# Patient Record
Sex: MALE | Race: WHITE | Employment: OTHER | URBAN - METROPOLITAN AREA
[De-identification: names, ages, dates, MRNs, and addresses within clinical notes are randomized per-mention and may not be internally consistent; named-entity substitution may affect disease eponyms.]

---

## 2019-08-27 ENCOUNTER — OFFICE VISIT (OUTPATIENT)
Dept: DERMATOLOGY | Age: 79
End: 2019-08-27
Payer: MEDICARE

## 2019-08-27 DIAGNOSIS — L82.1 SEBORRHEIC KERATOSES: ICD-10-CM

## 2019-08-27 DIAGNOSIS — D48.9 NEOPLASM OF UNCERTAIN BEHAVIOR: ICD-10-CM

## 2019-08-27 DIAGNOSIS — D23.9 DERMATOFIBROMA: ICD-10-CM

## 2019-08-27 DIAGNOSIS — L57.0 ACTINIC KERATOSES: Primary | ICD-10-CM

## 2019-08-27 DIAGNOSIS — D22.9 MULTIPLE BENIGN MELANOCYTIC NEVI: ICD-10-CM

## 2019-08-27 DIAGNOSIS — Z78.9 NON-TOBACCO USER: ICD-10-CM

## 2019-08-27 PROCEDURE — 11102 TANGNTL BX SKIN SINGLE LES: CPT | Performed by: DERMATOLOGY

## 2019-08-27 PROCEDURE — 1123F ACP DISCUSS/DSCN MKR DOCD: CPT | Performed by: DERMATOLOGY

## 2019-08-27 PROCEDURE — 4040F PNEUMOC VAC/ADMIN/RCVD: CPT | Performed by: DERMATOLOGY

## 2019-08-27 PROCEDURE — 17000 DESTRUCT PREMALG LESION: CPT | Performed by: DERMATOLOGY

## 2019-08-27 PROCEDURE — 99203 OFFICE O/P NEW LOW 30 MIN: CPT | Performed by: DERMATOLOGY

## 2019-08-27 PROCEDURE — G8427 DOCREV CUR MEDS BY ELIG CLIN: HCPCS | Performed by: DERMATOLOGY

## 2019-08-27 PROCEDURE — G8421 BMI NOT CALCULATED: HCPCS | Performed by: DERMATOLOGY

## 2019-08-27 PROCEDURE — 17003 DESTRUCT PREMALG LES 2-14: CPT | Performed by: DERMATOLOGY

## 2019-08-27 PROCEDURE — 1036F TOBACCO NON-USER: CPT | Performed by: DERMATOLOGY

## 2019-08-27 NOTE — PROGRESS NOTES
surgical history. No Known Allergies  No outpatient medications have been marked as taking for the 8/27/19 encounter (Office Visit) with Darren Bardales DO. Social History:   Social History     Socioeconomic History    Marital status: Unknown     Spouse name: Not on file    Number of children: Not on file    Years of education: Not on file    Highest education level: Not on file   Occupational History    Not on file   Social Needs    Financial resource strain: Not on file    Food insecurity:     Worry: Not on file     Inability: Not on file    Transportation needs:     Medical: Not on file     Non-medical: Not on file   Tobacco Use    Smoking status: Never Smoker    Smokeless tobacco: Never Used   Substance and Sexual Activity    Alcohol use: Not on file    Drug use: Not on file    Sexual activity: Not on file   Lifestyle    Physical activity:     Days per week: Not on file     Minutes per session: Not on file    Stress: Not on file   Relationships    Social connections:     Talks on phone: Not on file     Gets together: Not on file     Attends Mu-ism service: Not on file     Active member of club or organization: Not on file     Attends meetings of clubs or organizations: Not on file     Relationship status: Not on file    Intimate partner violence:     Fear of current or ex partner: Not on file     Emotionally abused: Not on file     Physically abused: Not on file     Forced sexual activity: Not on file   Other Topics Concern    Not on file   Social History Narrative    Not on file       Physical Examination     The following were examined and determined to be normal: Psych/Neuro, Head/face, Conjunctivae/eyelids, Gums/teeth/lips, Neck, Breast/axilla/chest, Abdomen, RUE, LUE, RLE, LLE and Nails/digits. buttocks. Areas covered by underwear garment(s) not examined. The following were examined and determined to be abnormal: Scalp/hair and Back.      -General: A+Ox3, NAD,

## 2019-08-29 LAB — DERMATOLOGY PATHOLOGY REPORT: NORMAL

## 2019-08-30 ENCOUNTER — TELEPHONE (OUTPATIENT)
Dept: DERMATOLOGY | Age: 79
End: 2019-08-30

## 2019-08-30 NOTE — TELEPHONE ENCOUNTER
----- Message from Uyen Negro DO sent at 8/30/2019  8:44 AM EDT -----  Moderately atypical mole, excised/margins clear with biopsy. No further treatment needed at this time.   Please notify patient of result and recommend scheduling 1 year skin exam

## 2020-06-30 ENCOUNTER — OFFICE VISIT (OUTPATIENT)
Dept: DERMATOLOGY | Age: 80
End: 2020-06-30
Payer: MEDICARE

## 2020-06-30 VITALS — TEMPERATURE: 97.5 F

## 2020-06-30 PROCEDURE — 1123F ACP DISCUSS/DSCN MKR DOCD: CPT | Performed by: DERMATOLOGY

## 2020-06-30 PROCEDURE — 1036F TOBACCO NON-USER: CPT | Performed by: DERMATOLOGY

## 2020-06-30 PROCEDURE — G8421 BMI NOT CALCULATED: HCPCS | Performed by: DERMATOLOGY

## 2020-06-30 PROCEDURE — 4040F PNEUMOC VAC/ADMIN/RCVD: CPT | Performed by: DERMATOLOGY

## 2020-06-30 PROCEDURE — G8427 DOCREV CUR MEDS BY ELIG CLIN: HCPCS | Performed by: DERMATOLOGY

## 2020-06-30 PROCEDURE — 99213 OFFICE O/P EST LOW 20 MIN: CPT | Performed by: DERMATOLOGY

## 2020-06-30 PROCEDURE — 17110 DESTRUCTION B9 LES UP TO 14: CPT | Performed by: DERMATOLOGY

## 2020-06-30 NOTE — PATIENT INSTRUCTIONS
dry it out with rubbing alcohol or hydrogen peroxide.  Continue this regimen until the area is pink and healed. Depending on the size and location of your cryosurgery site, healing may take 2 to 4 weeks.  The area may continue to be pink for several weeks, and over the next few months may become darker or lighter than the surrounding skin. This may be a permanent change. Thanks for your visit! Feel free to call Dr. Janeth Nolasco assistantLenora if you have questions, concerns or to schedule your cosmetic procedures.

## 2020-06-30 NOTE — PROGRESS NOTES
dysplastic nevus located to right paraspinal superior back, completely excised with biopsy  Patient denies past history of melanoma, NMSC, dysplastic nevi, or chronic skin rashes. PFHx: Denies hx of MM or NMSC    History reviewed. No pertinent family history. History reviewed. No pertinent past medical history. History reviewed. No pertinent surgical history. No Known Allergies  No outpatient medications have been marked as taking for the 6/30/20 encounter (Office Visit) with Marilyn Pathak DO.        Social History:   Social History     Socioeconomic History    Marital status: Unknown     Spouse name: Not on file    Number of children: Not on file    Years of education: Not on file    Highest education level: Not on file   Occupational History    Not on file   Social Needs    Financial resource strain: Not on file    Food insecurity     Worry: Not on file     Inability: Not on file    Transportation needs     Medical: Not on file     Non-medical: Not on file   Tobacco Use    Smoking status: Never Smoker    Smokeless tobacco: Never Used   Substance and Sexual Activity    Alcohol use: Not on file    Drug use: Not on file    Sexual activity: Not on file   Lifestyle    Physical activity     Days per week: Not on file     Minutes per session: Not on file    Stress: Not on file   Relationships    Social connections     Talks on phone: Not on file     Gets together: Not on file     Attends Worship service: Not on file     Active member of club or organization: Not on file     Attends meetings of clubs or organizations: Not on file     Relationship status: Not on file    Intimate partner violence     Fear of current or ex partner: Not on file     Emotionally abused: Not on file     Physically abused: Not on file     Forced sexual activity: Not on file   Other Topics Concern    Not on file   Social History Narrative    Not on file       Physical Examination     The following were examined and

## 2021-07-01 ENCOUNTER — OFFICE VISIT (OUTPATIENT)
Dept: DERMATOLOGY | Age: 81
End: 2021-07-01
Payer: MEDICARE

## 2021-07-01 VITALS — TEMPERATURE: 97.9 F

## 2021-07-01 DIAGNOSIS — L57.0 ACTINIC KERATOSIS: ICD-10-CM

## 2021-07-01 DIAGNOSIS — L81.4 SOLAR LENTIGINOSIS: ICD-10-CM

## 2021-07-01 DIAGNOSIS — L82.0 INFLAMED SEBORRHEIC KERATOSIS: Primary | ICD-10-CM

## 2021-07-01 DIAGNOSIS — D22.9 MULTIPLE BENIGN NEVI: ICD-10-CM

## 2021-07-01 DIAGNOSIS — Z86.018 HISTORY OF DYSPLASTIC NEVUS: ICD-10-CM

## 2021-07-01 PROCEDURE — G8427 DOCREV CUR MEDS BY ELIG CLIN: HCPCS | Performed by: DERMATOLOGY

## 2021-07-01 PROCEDURE — 4040F PNEUMOC VAC/ADMIN/RCVD: CPT | Performed by: DERMATOLOGY

## 2021-07-01 PROCEDURE — G8421 BMI NOT CALCULATED: HCPCS | Performed by: DERMATOLOGY

## 2021-07-01 PROCEDURE — 1036F TOBACCO NON-USER: CPT | Performed by: DERMATOLOGY

## 2021-07-01 PROCEDURE — 17110 DESTRUCTION B9 LES UP TO 14: CPT | Performed by: DERMATOLOGY

## 2021-07-01 PROCEDURE — 1123F ACP DISCUSS/DSCN MKR DOCD: CPT | Performed by: DERMATOLOGY

## 2021-07-01 PROCEDURE — 99213 OFFICE O/P EST LOW 20 MIN: CPT | Performed by: DERMATOLOGY

## 2021-07-01 PROCEDURE — 17000 DESTRUCT PREMALG LESION: CPT | Performed by: DERMATOLOGY

## 2021-07-01 NOTE — PROGRESS NOTES
[facility]) Dermatology  [facility], [place], [address]       Dipak Shrestha  1940    68 y.o. male     Date of Visit: 2021    Chief Complaint:   Chief Complaint   Patient presents with    Lesion(s)     TBSE, spots on arms. Hx of dysplastic nevus, AK's        I was asked to see this patient by Dr. Bhatti ref. provider found. History of Present Illness:  Dipak Shrestha is a 68 y.o. male who presents with the chief complaint of the followin. Total-body skin exam for spots. Many year history of multiple nevi on the head/neck, trunk and extremities, all present for many years. Denies new moles. Denies moles changing in size, shape, color. None associated w/ pain, bleeding, pruritus. Last visit 2020    2. Complains of 2 new irritated raised rough feeling growths to his left dorsal forearm. He states using over-the-counter AmLactin lotion does help to smooth and moisturize his remaining seborrheic keratoses. 3.Progressive freckling and lentigines located to sun exposed areas on head/neck, torso, extremities over several years,Denies changes in size, shape, or color. Admits to sun exposure in youth without wearing sunscreen, hats, or protective clothing.  wears sunscreen occasionally when outdoors for long periods of time, denies wearing sun hats. Wears ball caps when outdoors.     4. History of moderately dysplastic nevus located to right paraspinal superior back, completely excised with biopsy. Denies recurrence. Review of Systems:  Constitutional: Reports general sense of well-being   Skin: No new or changing moles, no tendency to develop thick scars, no interval of severe sunburns  Heme: No abnormal bruising or bleeding.       Past Skin Hx:  -2019-history of moderately dysplastic nevus located to right paraspinal superior back, completely excised with biopsy   Patient denies past history of melanoma, NMSC    PFHx: Denies hx of MM or NMSC    ADDITIONAL HISTORY:    I have reviewed past medical and surgical histories, current medications, allergies, social and family histories as documented in the patient's electronic medical record. History reviewed. No pertinent family history. History reviewed. No pertinent past medical history. History reviewed. No pertinent surgical history. No Known Allergies  No outpatient medications have been marked as taking for the 7/1/21 encounter (Office Visit) with Karthik Nguyen DO. Social History:   Social History     Socioeconomic History    Marital status: Unknown     Spouse name: Not on file    Number of children: Not on file    Years of education: Not on file    Highest education level: Not on file   Occupational History    Not on file   Tobacco Use    Smoking status: Never Smoker    Smokeless tobacco: Never Used   Substance and Sexual Activity    Alcohol use: Not on file    Drug use: Not on file    Sexual activity: Not on file   Other Topics Concern    Not on file   Social History Narrative    Not on file     Social Determinants of Health     Financial Resource Strain:     Difficulty of Paying Living Expenses:    Food Insecurity:     Worried About Running Out of Food in the Last Year:     [address] in the Last Year:    Transportation Needs:     Lack of Transportation (Medical):      Lack of Transportation (Non-Medical):    Physical Activity:     Days of Exercise per Week:     Minutes of Exercise per Session:    Stress:     Feeling of Stress :    Social Connections:     Frequency of Communication with Friends and Family:     Frequency of Social Gatherings with Friends and Family:     Attends Anabaptist Services:     Active Member of Clubs or Organizations:     Attends Club or Organization Meetings:     Marital Status:    Intimate Partner Violence:     Fear of Current or Ex-Partner:     Emotionally Abused:     Physically Abused:     Sexually Abused:        Physical Examination     The following were examined and determined to be normal: Psych/Neuro, Scalp/hair, Conjunctivae/eyelids, Gums/teeth/lips, Neck, Breast/axilla/chest, Abdomen, Back, RUE, RLE, LLE and Nails/digits. buttocks. Areas covered by underwear garment(s) not examined. The following were examined and determined to be abnormal: Head/face and LUE. Price phototype: 2    -Constitutional: Well appearing, no acute distress  -Neurological: Alert and oriented X 3  -Mood and Affect: Pleasant  Total body skin exam performed, areas examined listed above:   1. Left dorsal forearm-2 well-defined \"stuck-on\" verrucous tan-brown papule(s) w/ surrounding bright erythema  2. Left preauricular cheek- ill defined irreg shaped gritty keratotic pink macule(s)   3. Scattered on the head,neck, trunk and extremities are multiple well-defined round and oval symmetric smoothly-bordered uniformly brown macules and papules; no bleeding nevi. 4. several discrete and coalescing few 2-4 mm round uniformly brown macules scattered to face, neck, and sun exposed areas on torso and extremities  5. Right paraspinal superior back-well-healed scar, clear  Assessment and Plan     1. Inflamed seborrheic keratosis    2. Multiple benign nevi    3. Actinic keratosis    4. Solar lentiginosis    5. History of dysplastic nevus        1. Inflamed seborrheic keratosis  Patient educated that seborrheic keratoses are benign appearing, due to localized irritation/discomfort, pt elects for cryotherapy treatment today:   -2 lesion(s) treated w/ liquid nitrogen x 1cycles - 3 seconds each. Edu re: risk of blister formation, discomfort, scar, hypopigmentation, multiple treatments may be needed, risk of recurrence. Discussed wound care and instructions given to take home.  -Advised patient to call the office if recurs or worsens despite treatment. 2.Actinic keratosis  -Edu re: relationship with chronic cumulative sun exposure, low premalignant potential.   Verbal consent obtained. -L preauricular cheek, 1 lesion(s) treated w/ liquid nitrogen x 1cycles, 3 seconds each located    Edu re: risk of blister formation, discomfort, scar, dyspigmentation. Discussed wound care. -Reviewed sun protective behavior -- sun avoidance during the peak hours of the day, sun-protective clothing (including hat and sunglasses), sunscreen use (water resistant, broad spectrum, SPF at least 30, need for reapplication every 2 to 3 hours). -Patient to contact office if AK fails to resolve despite treatment or concern for recurrence (discussed signs/symptoms to monitor for) or if patient develops side effect from therapy, such as unbearable blister, crusting, scabbing, redness, or tenderness. 3.  Multiple benign nevi  Benign acquired melanocytic nevi  -Recommend monthly self skin exams   -Educated regarding the ABCDEs of melanoma detection   -Call for any new/changing moles or concerning lesions  -Reviewed sun protective behavior -- sun avoidance during the peak hours of the day, sun-protective clothing (including hat and sunglasses), sunscreen use (water resistant, broad spectrum, SPF at least 30, need for reapplication every 1.5 to 2 hours), avoidance of tanning beds   -Plan: Observation with annual skin checks (earlier if indicated) performed in office to monitor current nevi and to assess for new lesions. 4. Solar lentiginosis  Solar lentigines  -Edu re: benignity, relationship w/ chronic cumulative sun exposure, darkening w/ unprotected sun exposure  -Reviewed sun protective behavior -- sun avoidance during the peak hours of the day, sun-protective clothing (including hat and sunglasses), sunscreen use (water resistant, broad spectrum, SPF at least 30, need for reapplication every 2 to 3 hours), avoidance of tanning beds   Observation, pt to call if changes in size, shape, color or experiences bleeding/pain/itching      5.  History of dysplastic nevus  No evidence of recurrence  -Reviewed clinical significance of dysplastic nevi, which are markers for an increased risk for the development of melanoma. Although melanoma may sometimes develop within a dysplastic nevus, it more commonly develops de nila. Dysplastic nevi with moderate or severe cytologic atypia should be excised with clear margins. Recommend at least annual skin exams, earlier if notices suspicious new growths or changes. Return to Clinic: 1 year skin exam; PRN  Discussed plan with patient and/or primary caretaker. Patient to call clinic with any questions / concerns. Reviewed proper use and side effects of treatment(s) and/or medication(s) with patient and/or primary caretaker. AVS provided or is available on [employer]     Note is transcribed using voice recognition software. Inadvertent computerized transcription errors may be present.

## 2024-10-22 ENCOUNTER — PROCEDURE VISIT (OUTPATIENT)
Dept: SURGERY | Age: 84
End: 2024-10-22
Payer: MEDICARE

## 2024-10-22 VITALS — DIASTOLIC BLOOD PRESSURE: 89 MMHG | SYSTOLIC BLOOD PRESSURE: 175 MMHG | HEART RATE: 74 BPM | TEMPERATURE: 67 F

## 2024-10-22 DIAGNOSIS — C44.729 SQUAMOUS CELL CARCINOMA OF SKIN OF LEFT CALF: ICD-10-CM

## 2024-10-22 DIAGNOSIS — C44.729 SQUAMOUS CELL CARCINOMA OF LEFT LOWER LEG: Primary | ICD-10-CM

## 2024-10-22 PROCEDURE — 17313 MOHS 1 STAGE T/A/L: CPT | Performed by: DERMATOLOGY

## 2024-10-22 PROCEDURE — 17261 DSTRJ MAL LES T/A/L .6-1.0CM: CPT | Performed by: DERMATOLOGY

## 2024-10-22 NOTE — PATIENT INSTRUCTIONS
[facility] Surgery Office Hours:    Monday-Thursday  7:30 AM-4:30 PM    Friday  9:00 AM-1:00 PM     DAILY WOUND CARE-SECOND INTENTION - LEG    1.  Keep the area absolutely dry for  48 hours.          -After  48 hours you may remove the bandage and shower.  2.  Remove the bandage and clean the wound with mild soap and water. Try to clean off crust and debris.            It is important not to allow a scab to form as scabs slow down the healing process.   3. Soak with a diluted vinegar solution of 1 part white table vinegar to 4 parts water for 5 minutes daily to help disinfect the area.    4.  Apply a layer of Vaseline (or Bacitracin if your doctor recommends) to the wound area only.  5.  Cut a piece of Non-stick dressing, such as Telfa, to fit just over the wound and secure it with paper tape. If the wound is small you may use a Band-Aid  6.  Elevate your legs whenever you are in a seated position for more than 15 minutes.  Wear compression stockings or support hose if possible to reduce swelling.    You should continue daily wound care and keep a bandage on until new skin has grown over the entire wound    Bleeding: If bleeding occurs, DO NOT remove the bandage. Put firm pressure on the area with gauze for 20 minutes without peeking. If the bleeding continues, apply pressure for 20 minutes more.  If the bleeding does not stop after you apply pressure, call us right away. If you can’t call, go to the nearest emergency room or urgent care facility.    POST-OPERATIVE INSTRUCTIONS     1. Activity: Do not lift anything heavier than a gallon of milk for 1 week. Also, avoid strenuous activity such as running, power walking or contact sports.  2.  Eating and drinking: Do not drink alcohol for 48 hours after your procedure. Alcohol increases the chances of bleeding.  3.  Medicines:  -If you have discomfort, take acetaminophen (Tylenol or Extra Strength Tylenol). Follow the instructions and warning on the

## 2024-10-22 NOTE — PROGRESS NOTES
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     OPERATIVE PROCEDURE:   ELECTRODESICCATION AND CURETTAGE    LOCATION: Left Calf    SIZE OF LESION:8 x 7 MM    REFERRING PROVIDER: Amber Gomes MD    ANESTHESIA:  3 CC XYLOCAINE 1% WITH EPINEPHRINE 1:100,000 BUFFERED    DURATION OF PROCEDURE: 10 MINUTES    POSTOPERATIVE OBSERVATION: 30 MINUTES    EBL: MINIMAL.     SPECIMENS:  1    COMPLICATIONS: None    DESCRIPTION OF PROCEDURE: The patient was given a mirror and the biopsy site was identified, marked with surgical marking pen, and verified with the patient. Written consent was obtained. There was a time out for person and procedure verification. The operative site was cleansed with Chlorhexidine gluconate 2% with isopropylalcohol 70%, then cleaned off, dried, and draped. The lesion was curetted in several directions followed by electrocoagulation.  This was repeated 3 times.    The area was cleansed with sterile saline.  Petrolatum ointment was applied to the wound and a small pressure dressing was applied.  Detailed post-care instructions were verbally reviewed with the patient and a handout given.    The patient tolerated the procedure well without any complications.  The patient left the office in good condition.      The patient will return for wound check in 2 weeks.  
PRE-PROCEDURE SCREENING    Pacemaker/ICD: No  Difficulty with numbing in the past: No  Local Anesthesia Reaction/passing out: No  Latex or adhesive allergy:  No  Any history of reaction to suture or skin glue:  no  Bleeding/Clotting Disorders: No  Anticoagulant Therapy: No  Joint prosthesis: No  Artificial Heart Valve: No  Stroke or Seizures: No  Organ Transplant or Lymphoma: No  Immunosuppression: No  Respiratory Problems:  No   
clinically-apparent tumor was carefully defined and debulked, determining the edge of the surgical excision.    A thin layer of tumor-laden tissue was excised with a narrow margin of normal-appearing skin, using the technique of Mohs.  A map was prepared to correspond to the area of skin from which it was excised.    Hemostasis was achieved using electrosurgery.  The wound was bandaged.     The tissue was prepared for the cryostat and sectioned. 2 section(s) prepared. Each section was coded, cut, and stained for microscopic examination. The entire base and margins of the excised piece of tissue were examined by the surgeon.  The tissue was examined to the level of subcutaneous fat.    No tumor was identified at the peripheral margins of stage I of microscopically controlled surgery.          DEFECT MANAGEMENT:    REPAIR DESCRIPTION:  After discussion with the patient regarding the various options, it was decided to allow the defect to heal by second intention (granulation).  Healing time, wound care and scarring were discussed with the patient and he/she feels capable of taking care of the wound.     WOUND COVERAGE:  The wound was cleaned with normal saline solution, dried off, Aquaphor ointment was applied, and the wound was covered.  A dressing was applied for stabilization and light pressure.  The patient was given detailed oral and written instructions on postoperative care.     There were no complications.  The patient left the Unit in good medical condition.     FOLLOW-UP:  f/u w/c in 2 weeks.    [facility] Criteria Stage T2a:  High risk features identified: No  Size >/=2cm: Yes: Comment: 2.2cm  PNI: No  Poorly differentiated: No  Depth beyond subcutaneous fat: No    Any further testing ordered or indicated at this time?; I.e [place] testing, tissue sent for permanent section staining, CT scan or referral to Radiation oncology: No

## 2024-10-23 ENCOUNTER — TELEPHONE (OUTPATIENT)
Dept: SURGERY | Age: 84
End: 2024-10-23

## 2024-10-23 NOTE — TELEPHONE ENCOUNTER
The patient was in the office on 10/22/2024 for Mohs  located on the LT distal shin with 2nd intention wound healing repair.  The patient tolerated the procedure well and left the office in good condition.    Pain level on post-operative day 1:  pain level - minimal - 1/2 - has been taking Tylenol since yesterday and will continue through tomorrow.     Any bleeding episode that required pressure to be held, bandage change or a call to the office or MD?  no     Any other issues?:  no    A post-operative telephone call was placed at 10:58a, 10/23/2024, in order to check on the patient's recovery process.  The patient reported doing well and had no complaints other than those listed above, if any.  All of the patient's questions were answered. Advised to call w/any questions/concerns.

## 2024-10-25 LAB — DERMATOLOGY PATHOLOGY REPORT: ABNORMAL

## 2024-10-28 ENCOUNTER — TELEPHONE (OUTPATIENT)
Dept: SURGERY | Age: 84
End: 2024-10-28

## 2024-10-28 NOTE — TELEPHONE ENCOUNTER
I spoke with the patient today by telephone.  I reviewed results of the biopsy with the patient.    Date of biopsy: 10/8/2024  Site of biopsy: Left calf  Result: Squamous cell carcinoma, well differentiated    Plan: Curettaged at time of biopsy, no further treatment at this time unless lesion recurs.    The patient expressed understanding of the plan.

## 2024-11-06 ENCOUNTER — PROCEDURE VISIT (OUTPATIENT)
Dept: SURGERY | Age: 84
End: 2024-11-06
Payer: MEDICARE

## 2024-11-06 VITALS — HEART RATE: 62 BPM | DIASTOLIC BLOOD PRESSURE: 70 MMHG | SYSTOLIC BLOOD PRESSURE: 147 MMHG

## 2024-11-06 DIAGNOSIS — D48.5 NEOPLASM OF UNCERTAIN BEHAVIOR OF SKIN: ICD-10-CM

## 2024-11-06 DIAGNOSIS — Z51.89 VISIT FOR WOUND CHECK: ICD-10-CM

## 2024-11-06 DIAGNOSIS — C44.722 SQUAMOUS CELL CARCINOMA OF LEG, RIGHT: ICD-10-CM

## 2024-11-06 DIAGNOSIS — C44.722 SQUAMOUS CELL CARCINOMA, LEG, RIGHT: Primary | ICD-10-CM

## 2024-11-06 PROCEDURE — 1123F ACP DISCUSS/DSCN MKR DOCD: CPT | Performed by: DERMATOLOGY

## 2024-11-06 PROCEDURE — 17262 DSTRJ MAL LES T/A/L 1.1-2.0: CPT | Performed by: DERMATOLOGY

## 2024-11-06 PROCEDURE — G8421 BMI NOT CALCULATED: HCPCS | Performed by: DERMATOLOGY

## 2024-11-06 PROCEDURE — 17313 MOHS 1 STAGE T/A/L: CPT | Performed by: DERMATOLOGY

## 2024-11-06 PROCEDURE — G8484 FLU IMMUNIZE NO ADMIN: HCPCS | Performed by: DERMATOLOGY

## 2024-11-06 PROCEDURE — G8427 DOCREV CUR MEDS BY ELIG CLIN: HCPCS | Performed by: DERMATOLOGY

## 2024-11-06 PROCEDURE — 99213 OFFICE O/P EST LOW 20 MIN: CPT | Performed by: DERMATOLOGY

## 2024-11-06 PROCEDURE — 1036F TOBACCO NON-USER: CPT | Performed by: DERMATOLOGY

## 2024-11-06 RX ORDER — BISMUTH TRIBROMOPH/PETROLATUM 5"X9"
2 BANDAGE TOPICAL DAILY
Qty: 2 EACH | Refills: 5 | Status: SHIPPED | OUTPATIENT
Start: 2024-11-06 | End: 2024-12-06

## 2024-11-06 NOTE — PROGRESS NOTES
MOHS PROCEDURE NOTE    PHYSICIAN:  Amber Gomes MD, Who operated in two distinct and integrated capacities as the surgeon removing the tissue and as the pathologist examining the tissue.    ASSISTANT: Shaun Simpson RN and Delisa Broussard MA      REFERRING PROVIDER:  Radha Lowe MD     PREOPERATIVE DIAGNOSIS: Invasive well-differentiated Squamous Cell Carcinoma     SPECIFIC MOHS INDICATIONS:  Size, location and need for tissue conservation    AUC SCORIN/9    POSTOPERATIVE DIAGNOSIS: SAME    LOCATION: Right mid shin    OPERATIVE PROCEDURE:  MOHS MICROGRAPHIC SURGERY    RECONSTRUCTION OF DEFECT: Second Intention Wound Healing    PREOPERATIVE SIZE: 21x15 MM    DEFECT SIZE: 26x23 MM    LENGTH OF REPAIRED WOUND/SIZE OF FLAP/SIZE OF GRAFT:  N/A    ANESTHESIA: 10 mL 1% lidocaine with epinephrine 1:100,000 buffered.     EBL:  MINIMAL    DURATION OF PROCEDURE:  1 HOUR    POSTOPERATIVE OBSERVATION: 0.5 HOUR    SPECIMENS:  SEE MOHS MAP    COMPLICATIONS:  NONE    DESCRIPTION OF PROCEDURE:  The patient was given a mirror, as appropriate, and the biopsy site was identified, marked with a surgical marking pen, and verified by the patient.   Options for treatment were discussed and the patient was informed that Mohs surgery was the selected treatment based on its lower recurrence rate, given the features listed above, as compared to other treatment modalities such as excision, radiation, or curettage, and agreed with this treatment plan.  Risks and benefits including bruising, swelling, bleeding, infection, nerve injury, recurrence, and scarring were discussed with the patient prior to the procedure and a written consent detailing these and other risks was reviewed with the patient and signed.    There was a time out for person and procedure verification.  The surgical site was prepped with an antiseptic solution.  Application of an antiseptic solution was repeated before each surgical stage.      Stage I:  The

## 2024-11-06 NOTE — PROGRESS NOTES
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     OPERATIVE PROCEDURE:   ELECTRODESICCATION AND CURETTAGE    LOCATION: Right lower lateral shin    SIZE OF LESION:16x13 MM    REFERRING PROVIDER: Dr. Amber Gomes MD    ANESTHESIA:  5 CC XYLOCAINE 1% WITH EPINEPHRINE 1:100,000 BUFFERED    DURATION OF PROCEDURE: 30 MINUTES    POSTOPERATIVE OBSERVATION: 30 MINUTES    EBL: MINIMAL.     SPECIMENS:  One    COMPLICATIONS: None    DESCRIPTION OF PROCEDURE: The patient was given a mirror and the biopsy site was identified, marked with surgical marking pen, and verified with the patient. Written consent was obtained. There was a time out for person and procedure verification. The operative site was cleansed with Chlorhexidine gluconate 2% with isopropylalcohol 70%, then cleaned off, dried, and draped. The lesion was curetted in several directions followed by electrocoagulation.  This was repeated 3 times.    The area was cleansed with sterile saline.  Petrolatum ointment was applied to the wound and a small pressure dressing was applied.  Detailed post-care instructions were verbally reviewed with the patient and a handout given.    The patient tolerated the procedure well without any complications.  The patient left the office in good condition.      The patient will return for wound check in 2 weeks.

## 2024-11-06 NOTE — PATIENT INSTRUCTIONS
[facility] Office Hours:    Monday-Thursday  7:30 AM-4:30 PM    Friday  9:00 AM-1:00 PM    DAILY WOUND CARE-SECOND INTENTION - LEG    1.  Keep the area absolutely dry for 24 to 48 hours.          -After 24 to 48 hours you may remove the bandage and shower.  2.  Remove the bandage and clean the wound with mild soap and water. Try to clean off crust and debris.            It is important not to allow a scab to form as scabs slow down the healing process.   3. Soak with a diluted vinegar solution of 1 part white table vinegar to 4 parts water for 5 minutes daily to help disinfect the area.    4.  Apply a layer of Vaseline (or Bacitracin if your doctor recommends) to the wound area only.  5.  Cut a piece of Non-stick dressing, such as Telfa, to fit just over the wound and secure it with paper tape. If the wound is small you may use a Band-Aid  6.  Elevate your legs whenever you are in a seated position for more than 15 minutes.  Wear compression stockings or support hose if possible to reduce swelling.    You should continue daily wound care and keep a bandage on until new skin has grown over the entire wound    Bleeding: If bleeding occurs, DO NOT remove the bandage. Put firm pressure on the area with gauze for 20 minutes without peeking. If the bleeding continues, apply pressure for 20 minutes more.  If the bleeding does not stop after you apply pressure, call us right away. If you can’t call, go to the nearest emergency room or urgent care facility.    POST-OPERATIVE INSTRUCTIONS     1. Activity: Do not lift anything heavier than a gallon of milk for 1 week. Also, avoid strenuous activity such as running, power walking or contact sports.  2.  Eating and drinking: Do not drink alcohol for 48 hours after your procedure. Alcohol increases the chances of bleeding.  3.  Medicines:  -If you have discomfort, take acetaminophen (Tylenol or Extra Strength Tylenol). Follow the instructions and warning on

## 2024-11-07 ENCOUNTER — HOSPITAL ENCOUNTER (EMERGENCY)
Age: 84
Discharge: HOME | End: 2024-11-07
Attending: EMERGENCY MEDICINE
Payer: MEDICARE

## 2024-11-07 ENCOUNTER — TELEPHONE (OUTPATIENT)
Dept: SURGERY | Age: 84
End: 2024-11-07

## 2024-11-07 VITALS
BODY MASS INDEX: 30.61 KG/M2 | SYSTOLIC BLOOD PRESSURE: 166 MMHG | TEMPERATURE: 97.7 F | HEIGHT: 67 IN | OXYGEN SATURATION: 95 % | RESPIRATION RATE: 18 BRPM | DIASTOLIC BLOOD PRESSURE: 74 MMHG | HEART RATE: 64 BPM | WEIGHT: 195 LBS

## 2024-11-07 DIAGNOSIS — R58 ARTERIAL HEMORRHAGE: Primary | ICD-10-CM

## 2024-11-07 PROCEDURE — 99283 EMERGENCY DEPT VISIT LOW MDM: CPT

## 2024-11-07 PROCEDURE — 2500000003 HC RX 250 WO HCPCS: Performed by: EMERGENCY MEDICINE

## 2024-11-07 RX ORDER — TRANEXAMIC ACID 100 MG/ML
500 INJECTION, SOLUTION INTRAVENOUS ONCE
Status: COMPLETED | OUTPATIENT
Start: 2024-11-07 | End: 2024-11-07

## 2024-11-07 RX ADMIN — TRANEXAMIC ACID 500 MG: 1 INJECTION, SOLUTION INTRAVENOUS at 02:35

## 2024-11-07 ASSESSMENT — LIFESTYLE VARIABLES
HOW OFTEN DO YOU HAVE A DRINK CONTAINING ALCOHOL: NEVER
HOW MANY STANDARD DRINKS CONTAINING ALCOHOL DO YOU HAVE ON A TYPICAL DAY: PATIENT DOES NOT DRINK

## 2024-11-07 ASSESSMENT — PAIN - FUNCTIONAL ASSESSMENT: PAIN_FUNCTIONAL_ASSESSMENT: NONE - DENIES PAIN

## 2024-11-07 NOTE — TELEPHONE ENCOUNTER
Pt had surgery yesterday (SITE#1 RIGHT MID SHIN, WELL-DIFF SCC ) and around midnight he could not control the bleeding so he presented to [facility] ER where they stitched the surgery site. He would like a call back re: how to take care of area.

## 2024-11-07 NOTE — DISCHARGE INSTRUCTIONS
Once per day for the next 5 days:  Remove your bandage(s)  Clean your wound carefully with soap and water  Gently dry the area  Cover your wound with the dressing supplies we provided you with     Be sure to contact the doctor who performed you procedure to arrange for a follow up visit.    If you have any new or worsening issues after going home don't hesitate to return here for reevaluation at any time 24/7!

## 2024-11-07 NOTE — TELEPHONE ENCOUNTER
Discussed with the patient to keep the bandage on and dry until Sunday and then start daily wound care that were given the day of his surgery.Stressed to him to try and stay off his feet today and keep it elevated. The patient verbalized understanding, no further questions.

## 2024-11-07 NOTE — PROGRESS NOTES
S: The patient is here for wound check s/p Mohs surgery on the left lower leg with second intent wound healing, 2 week(s) ago.  The patient has no complaints.  O:  The wound has minimal fibrin.  No erythema/purulence/pain.      A/P:  Chronic problem: is not at treatment goal:  open wound of the left lower leg s/p Mohs surgery.  Status:  The wound is healing well by second intention.  No s/sx infection/bleeding.     The area was cleansed with a gentle cleanser, a small amount of Aquaphor and a non-stick dressing applied.   Detailed second intention wound care instructions reviewed with the patient including daily gentle cleansing with mild cleanser such as cetaphil, application of topical petrolatum or aquaphor and wound coverage. Reminder to remove excessive fibrin as necessary, best after cleansing when fibrin is less adherent.  Pt education on how to perform this.  Healing time discussed.  The patient is scheduled for f/u wound check in 2 week(s).    OTC Meds:  aquaphor  Prescription Meds:  no  Co-morbid conditions affecting healing (I.e. tobacco, diabetes, pvd, peripheral edema, immunosuppression):  no

## 2024-11-11 LAB — DERMATOLOGY PATHOLOGY REPORT: ABNORMAL

## 2024-11-12 ENCOUNTER — TELEPHONE (OUTPATIENT)
Dept: SURGERY | Age: 84
End: 2024-11-12

## 2024-11-12 NOTE — TELEPHONE ENCOUNTER
----- Message from Dr. Amber Gomes MD sent at 11/12/2024  8:13 AM EST -----  Curettaged at the time of biopsy. No further tx unless the lesion regrows. If that were to happen the pt should let me or his referring dermatologist know.  
I reviewed results of the biopsy with the patient's wife, Torrie.    Date of biopsy: 11/6/24  Site of biopsy: Right lower lateral shin  Result: Well-differentiated squamous cell carcinoma    Plan: Curettaged at the time of biopsy. No further treatment unless the lesion regrows. If that were to happen explained the patient should let Dr. Gomes or his referring dermatologist know.    The patient's wife expressed understanding of the plan.    
room air

## 2024-11-12 NOTE — ED PROVIDER NOTES
[facility] EMERGENCY DEPARTMENT    Name: Chris Andrew : 1940 MRN: [id number] Date of Service: 2024    Initial VS: BP: (!) 166/74, Temp: 97.7 °F (36.5 °C), Pulse: 64, Respirations: 18, SpO2: 95 %     CC: bleeding wound    HPI: this patient is a 83 y.o. male presenting to the ED from home. Mr. Andrew just underwent a Mohs procedure involving an are of his right lower leg late yesterday morning. He awoke from sleep early this morning as his right lower leg felt moist and his bedding felt damp. He examined this area more closely and found that he was bleeding from his procedure site. He applied a washcloth to this site, held pressure to the area, and had his significant other bring him here to be evaluated. On arrival here he has ongoing bleeding from that area. He is not experiencing pain to that site. He has not appreciated changes from his usual state of health and he denies other current complaints.  _____________________________________________________________________    Review of Systems   Constitutional:  Negative for fatigue.   Musculoskeletal:  Negative for arthralgias, gait problem and myalgias.   Skin:  Positive for wound. Negative for pallor.   Neurological:  Negative for dizziness, weakness and light-headedness.       Physical Exam  Constitutional:       General: He is awake. He is not in acute distress.     Appearance: He is not ill-appearing, toxic-appearing or diaphoretic.   HENT:      Head: Normocephalic and atraumatic.   Eyes:      Conjunctiva/sclera: Conjunctivae normal.   Neck:      Vascular: No JVD.   Cardiovascular:      Rate and Rhythm: Normal rate and regular rhythm.      Pulses:           Dorsalis pedis pulses are 2+ on the right side.        Posterior tibial pulses are 2+ on the right side.      Heart sounds: Normal heart sounds. No murmur heard.  Pulmonary:      Effort: Pulmonary effort is normal. No respiratory distress.      Breath sounds: Normal breath sounds.

## 2024-12-02 ENCOUNTER — OFFICE VISIT (OUTPATIENT)
Dept: SURGERY | Age: 84
End: 2024-12-02
Payer: MEDICARE

## 2024-12-02 DIAGNOSIS — Z51.89 VISIT FOR WOUND CHECK: Primary | ICD-10-CM

## 2024-12-02 PROCEDURE — 1123F ACP DISCUSS/DSCN MKR DOCD: CPT | Performed by: DERMATOLOGY

## 2024-12-02 PROCEDURE — 1159F MED LIST DOCD IN RCRD: CPT | Performed by: DERMATOLOGY

## 2024-12-02 PROCEDURE — 99213 OFFICE O/P EST LOW 20 MIN: CPT | Performed by: DERMATOLOGY

## 2024-12-02 NOTE — PROGRESS NOTES
S: The patient is here for wound check s/p Mohs surgery on the right mid shin with second intent wound healing, 3.5 week(s) ago and left distal shin  6 weeks ago.  The patient has no complaints today .  O:  The wound on left shin has hypergranulation tissue. The wound on the right shin has min-mod excess fibrin.  No erythema/purulence/pain.      A/P:  Chronic problem: is not at treatment goal:  open wound of the right mid shin and left distal shin s/p Mohs surgery.  Status:  The wound is healing well by second intention.  No s/sx infection/bleeding. Continue xeroform gauze on right shin.  D/c xeroform gauze on left shin and start using peroxide once daily and leaving open to air for approx 2 hours per day.        The area was cleansed with a gentle cleanser, a small amount of Aquaphor and a non-stick dressing applied.   Detailed second intention wound care instructions reviewed with the patient including daily gentle cleansing with mild cleanser such as cetaphil, application of topical petrolatum or aquaphor and wound coverage. Reminder to remove excessive fibrin as necessary, best after cleansing when fibrin is less adherent.  Pt education on how to perform this.  Healing time discussed.  The patient is scheduled for f/u wound check in 4 week(s).    OTC Meds:  xeroform on right only  Prescription Meds:  none  Co-morbid conditions affecting healing (I.e. tobacco, diabetes, pvd, peripheral edema, immunosuppression):  no    Electronically signed by Maria Ines Jasso RN on 12/2/2024 at 3:25 PM    IMaria Ines RN, am scribing for and in the presence of Dr.Emily Gomes,12/2/2024.    Amber HUNTER MD,  personally performed the services described in this documentation as scribed by Maria Ines Jasso RN  in my presence, and it is both accurate and complete.     Electronically signed by Amber Gomes MD on 12/2/2024 at 3:39 PM

## 2024-12-02 NOTE — PROGRESS NOTES
S: The patient is here for wound check s/p Mohs surgery on the left distal shin with second intent wound healing, week(s) ago.  The patient feels the area is healing without complications.  O:  The wound has ***.  No erythema/purulence/pain.      A/P:  Chronic problem: is not at treatment goal:  open wound of the left distal shin s/p Mohs surgery.  Status:  The wound is healing well by second intention.  No s/sx infection/bleeding. ***    The area was cleansed with a gentle cleanser, a small amount of Aquaphor and a non-stick dressing applied.   Detailed second intention wound care instructions reviewed with the patient including daily gentle cleansing with mild cleanser such as cetaphil, application of topical petrolatum or aquaphor and wound coverage. Reminder to remove excessive fibrin as necessary, best after cleansing when fibrin is less adherent.  Pt education on how to perform this.  Healing time discussed.  The patient is scheduled for f/u wound check in 4 week(s).    OTC Meds:  Vaseline/aquahor, hydrogen perioxide  Prescription Meds:  none  Co-morbid conditions affecting healing (I.e. tobacco, diabetes, pvd, peripheral edema, immunosuppression):  ***    Electronically signed by Maria Ines Jasso RN on 12/2/2024 at 3:28 PM    IMaria Ines RN, am scribing for and in the presence of Dr.Emily Gomes,12/2/2024.

## 2025-01-21 ENCOUNTER — OFFICE VISIT (OUTPATIENT)
Dept: SURGERY | Age: 85
End: 2025-01-21
Payer: MEDICARE

## 2025-01-21 DIAGNOSIS — Z51.89 VISIT FOR WOUND CHECK: Primary | ICD-10-CM

## 2025-01-21 PROCEDURE — 99212 OFFICE O/P EST SF 10 MIN: CPT | Performed by: DERMATOLOGY

## 2025-01-21 PROCEDURE — 1123F ACP DISCUSS/DSCN MKR DOCD: CPT | Performed by: DERMATOLOGY

## 2025-01-21 PROCEDURE — 1159F MED LIST DOCD IN RCRD: CPT | Performed by: DERMATOLOGY

## 2025-01-21 NOTE — PROGRESS NOTES
S: The patient is here for wound check s/p Mohs surgery on the  left distal shin  with second intent wound healing, 3 month(s) ago.  The patient has no complaints.  O:  The wound has no fibrin.  No erythema/purulence/pain.      A/P:  Chronic problem: is at treatment goal:  closed wound of the left distal shin s/p Mohs surgery.  Status:  The wound is healing well by second intention.  No s/sx infection/bleeding. ***    The area was cleansed with a gentle cleanser, a small amount of Aquaphor and a non-stick dressing applied.   Detailed second intention wound care instructions reviewed with the patient including daily gentle cleansing with mild cleanser such as cetaphil, application of topical petrolatum or aquaphor and wound coverage. Reminder to remove excessive fibrin as necessary, best after cleansing when fibrin is less adherent.  Pt education on how to perform this.  Healing time discussed.  The patient is scheduled for f/u wound check in prn week(s).    OTC Meds:  none  Prescription Meds:  None  Co-morbid conditions affecting healing (I.e. tobacco, diabetes, pvd, peripheral edema, immunosuppression):  ***    Electronically signed by Maria Ines Jasso RN on 1/21/2025 at 3:03 PM    IMaria Ines RN, am scribing for and in the presence of Dr.Emily Gomes,1/21/2025.

## 2025-01-21 NOTE — PROGRESS NOTES
S: The patient is here for wound check s/p Mohs surgery on the right mid shin with second intent wound healing, 10 week(s) ago.  The patient has no complaints and the left distal shin with 2nd intent wound healing 3 months ago.  O:  The wounds has no fibrin.  No erythema/purulence/pain.  Appear healed    A/P:  scar x 2: Chronic problem: are at treatment goal:  closed wound of the right mid shin and left distal shin s/p Mohs surgery.  Status:  The wound has healed with scar, no s/sx of recurrence.    OTC Meds:  None  Prescription Meds:  None  Co-morbid conditions affecting healing (I.e. tobacco, diabetes, pvd, peripheral edema, immunosuppression):  no    Electronically signed by Maria Ines Jasso RN on 1/21/2025 at 3:03 PM    I, Maria Ines Jasso RN, am scribing for and in the presence of Dr.Emily Gomes,1/21/2025.    Amber HUNTER MD,  personally performed the services described in this documentation as scribed by Maria Ines Jasso RN  in my presence, and it is both accurate and complete.     Electronically signed by Amber Gomes MD on 1/21/2025 at 3:22 PM